# Patient Record
Sex: MALE | Race: BLACK OR AFRICAN AMERICAN | NOT HISPANIC OR LATINO | Employment: FULL TIME | URBAN - METROPOLITAN AREA
[De-identification: names, ages, dates, MRNs, and addresses within clinical notes are randomized per-mention and may not be internally consistent; named-entity substitution may affect disease eponyms.]

---

## 2018-01-22 ENCOUNTER — HOSPITAL ENCOUNTER (OUTPATIENT)
Dept: INTERVENTIONAL RADIOLOGY/VASCULAR | Facility: HOSPITAL | Age: 28
Discharge: HOME | End: 2018-01-22
Attending: FAMILY MEDICINE
Payer: OTHER GOVERNMENT

## 2018-01-22 ENCOUNTER — HOSPITAL ENCOUNTER (OUTPATIENT)
Dept: RADIOLOGY | Facility: HOSPITAL | Age: 28
Discharge: HOME | End: 2018-01-22
Attending: FAMILY MEDICINE
Payer: OTHER GOVERNMENT

## 2018-01-22 DIAGNOSIS — M25.512 PAIN IN LEFT SHOULDER: ICD-10-CM

## 2018-01-22 PROCEDURE — 77002 NEEDLE LOCALIZATION BY XRAY: CPT | Mod: TC

## 2018-01-22 PROCEDURE — 23350 INJECTION FOR SHOULDER X-RAY: CPT | Mod: LT,,, | Performed by: RADIOLOGY

## 2018-01-22 PROCEDURE — 73222 MRI JOINT UPR EXTREM W/DYE: CPT | Mod: 26,LT,, | Performed by: RADIOLOGY

## 2018-01-22 PROCEDURE — 73222 MRI JOINT UPR EXTREM W/DYE: CPT | Mod: TC,LT

## 2018-01-22 PROCEDURE — A9585 GADOBUTROL INJECTION: HCPCS

## 2018-01-22 PROCEDURE — 25500020 PHARM REV CODE 255

## 2018-01-22 PROCEDURE — 23350 INJECTION FOR SHOULDER X-RAY: CPT

## 2018-01-22 PROCEDURE — 77002 NEEDLE LOCALIZATION BY XRAY: CPT | Mod: 26,,, | Performed by: RADIOLOGY

## 2018-01-22 RX ORDER — GADOBUTROL 604.72 MG/ML
1 INJECTION INTRAVENOUS
Status: COMPLETED | OUTPATIENT
Start: 2018-01-22 | End: 2018-01-22

## 2018-01-22 RX ADMIN — GADOBUTROL 1 ML: 604.72 INJECTION INTRAVENOUS at 03:01

## 2018-01-22 RX ADMIN — IOHEXOL 8 ML: 350 INJECTION, SOLUTION INTRAVENOUS at 03:01

## 2018-01-29 ENCOUNTER — HOSPITAL ENCOUNTER (EMERGENCY)
Facility: HOSPITAL | Age: 28
Discharge: HOME | End: 2018-01-29
Attending: EMERGENCY MEDICINE
Payer: OTHER GOVERNMENT

## 2018-01-29 VITALS
HEART RATE: 58 BPM | DIASTOLIC BLOOD PRESSURE: 78 MMHG | OXYGEN SATURATION: 99 % | RESPIRATION RATE: 18 BRPM | HEIGHT: 69 IN | SYSTOLIC BLOOD PRESSURE: 133 MMHG | WEIGHT: 185 LBS | TEMPERATURE: 98 F | BODY MASS INDEX: 27.4 KG/M2

## 2018-01-29 DIAGNOSIS — A08.4 VIRAL GASTROENTERITIS: Primary | ICD-10-CM

## 2018-01-29 PROCEDURE — 99283 EMERGENCY DEPT VISIT LOW MDM: CPT

## 2018-01-29 PROCEDURE — 25000003 PHARM REV CODE 250: Performed by: NURSE PRACTITIONER

## 2018-01-29 RX ORDER — ONDANSETRON 8 MG/1
8 TABLET, ORALLY DISINTEGRATING ORAL
Status: COMPLETED | OUTPATIENT
Start: 2018-01-29 | End: 2018-01-29

## 2018-01-29 RX ORDER — ONDANSETRON 4 MG/1
4 TABLET, FILM COATED ORAL EVERY 6 HOURS PRN
Qty: 12 TABLET | Refills: 0 | Status: SHIPPED | OUTPATIENT
Start: 2018-01-29

## 2018-01-29 RX ORDER — DICYCLOMINE HYDROCHLORIDE 20 MG/1
20 TABLET ORAL 2 TIMES DAILY PRN
Qty: 20 TABLET | Refills: 0 | Status: SHIPPED | OUTPATIENT
Start: 2018-01-29 | End: 2018-02-28

## 2018-01-29 RX ORDER — DICYCLOMINE HYDROCHLORIDE 10 MG/1
20 CAPSULE ORAL
Status: COMPLETED | OUTPATIENT
Start: 2018-01-29 | End: 2018-01-29

## 2018-01-29 RX ADMIN — DICYCLOMINE HYDROCHLORIDE 20 MG: 10 CAPSULE ORAL at 04:01

## 2018-01-29 RX ADMIN — ONDANSETRON 8 MG: 8 TABLET, ORALLY DISINTEGRATING ORAL at 04:01

## 2018-01-29 NOTE — ED PROVIDER NOTES
"Encounter Date: 1/29/2018       History     Chief Complaint   Patient presents with    Vomiting     states " I think I have a stomach virus, I have been throwing up all day". Denies taking any medication     CC: Vomiting    HPI: Patient is a 27-year-old male with no known medical history who presents today with chief complaint of vomiting.  He reports last night he began to feel nauseated and today he has been unable to keep anything down.  He reports 3 episodes of emesis today.  He also reports 10 episodes of diarrhea today.  No blood in his vomit or stool.  He thinks he may have gotten food poisoning.  He denies fever, chills, shortness of breath, chest pain, abdominal pain, dysuria, increased urinary urgency or frequency.      The history is provided by the patient. No  was used.     Review of patient's allergies indicates:   Allergen Reactions    Sulfa (sulfonamide antibiotics) Hives     History reviewed. No pertinent past medical history.  Past Surgical History:   Procedure Laterality Date    ADENOIDECTOMY      TONSILLECTOMY       History reviewed. No pertinent family history.  Social History   Substance Use Topics    Smoking status: Former Smoker    Smokeless tobacco: Never Used    Alcohol use Yes      Comment: occ     Review of Systems   Constitutional: Negative for chills and fever.   HENT: Negative for sore throat.    Respiratory: Negative for shortness of breath.    Cardiovascular: Negative for chest pain.   Gastrointestinal: Positive for diarrhea, nausea and vomiting. Negative for abdominal distention, abdominal pain, anal bleeding, blood in stool, constipation and rectal pain.   Genitourinary: Negative for decreased urine volume, difficulty urinating, discharge, dysuria, frequency, hematuria, penile pain, penile swelling, scrotal swelling, testicular pain and urgency.   Musculoskeletal: Negative for back pain, neck pain and neck stiffness.   Skin: Negative for rash. "   Neurological: Negative for weakness, light-headedness and headaches.   Hematological: Does not bruise/bleed easily.       Physical Exam     Initial Vitals [01/29/18 1600]   BP Pulse Resp Temp SpO2   (!) 144/66 62 18 98.2 °F (36.8 °C) 100 %      MAP       92         Physical Exam    Vitals reviewed.  Constitutional: He appears well-developed and well-nourished. He is not diaphoretic.  Non-toxic appearance. He does not have a sickly appearance. He does not appear ill. No distress.   HENT:   Head: Normocephalic and atraumatic.   Right Ear: External ear normal.   Left Ear: External ear normal.   Neck: Normal range of motion.   Cardiovascular: Normal rate, regular rhythm and normal heart sounds. Exam reveals no gallop and no friction rub.    No murmur heard.  Pulmonary/Chest: Breath sounds normal. No respiratory distress. He has no wheezes. He has no rhonchi. He has no rales. He exhibits no tenderness.   Abdominal: Soft. Bowel sounds are normal. He exhibits no distension and no mass. There is no hepatosplenomegaly. There is no tenderness. There is no rigidity, no rebound, no guarding, no CVA tenderness, no tenderness at McBurney's point and negative Silveira's sign.   Musculoskeletal: Normal range of motion.   Neurological: He is alert and oriented to person, place, and time. GCS eye subscore is 4. GCS verbal subscore is 5. GCS motor subscore is 6.   Skin: Skin is warm, dry and intact. No rash noted. No erythema.   Psychiatric: He has a normal mood and affect. Thought content normal.         ED Course   Procedures  Labs Reviewed - No data to display          Medical Decision Making:   ED Management:  Physical Exam shows a non-toxic, afebrile, and well appearing male. On examination the abdomen is flat without distention.  There is no surface trauma, scars, incisions.  Bowel sounds are present in all 4 quadrants.  No tenderness, guarding, rigidity to palpation.  No tenderness, masses, pulsation in epigastric area.  No  organomegaly.  Negative Silveira sign.  No periumbilical tenderness.  No rebound in the lower quadrants.  Nontender over McBurney's point.  No suprapubic tenderness or distention.  Good femoral pulses bilaterally.  No CVA tenderness.    Vital Signs Are Reassuring. If available, previous records reviewed.     My overall impression is viral gastroenteritis. I considered, but at this time, do not suspect C. difficile, dysentery, inflammatory bowel disease, diverticulitis, ulcerative colitis.    Patient given Zofran and Bentyl with good relief in symptoms.  No episodes of vomiting or diarrhea in the ED.  Abdomen is soft and nontender.  I believe that this is a viral gastroenteritis.  His vital signs are stable.  He is not hypotensive, tachycardic, febrile.  I feel is appropriate discharge him home with Bentyl and Zofran for symptomatic relief.  He is encouraged to increase oral fluid intake and follow-up with his PCP.    ED Course: bentyl, zofran. D/C Meds: bentyl, zofran. Additional D/C Information: Proventil Tylenol for fever and pain.  Increase oral fluid intake. The diagnosis, treatment plan, instructions for follow-up and reevaluation with PCP as well as ED return precautions were discussed and understanding was verbalized. All questions or concerns have been addressed.     This case was discussed with Dr. Nicolas who is in agreement with my assessment and plan.                    ED Course      Clinical Impression:   The encounter diagnosis was Viral gastroenteritis.    Disposition:   Disposition: Discharged  Condition: Stable                        Shirley Valdez NP  01/29/18 7522

## 2018-01-29 NOTE — DISCHARGE INSTRUCTIONS
Please return to the Emergency Department for any new or worsening symptoms including: abdominal pain, fever, chest pain, shortness of breath, loss of consciousness, dizziness, weakness, or any other concerns.     Please follow up with your Primary Care Provider within the week. If you do not have one, you may contact the one listed on your discharge paperwork or you may also call the [facility] Appointment Desk at [phone] to schedule an appointment with one.     Please take all medication as prescribed.

## 2018-11-06 ENCOUNTER — HOSPITAL ENCOUNTER (EMERGENCY)
Facility: HOSPITAL | Age: 28
Discharge: HOME | End: 2018-11-06
Attending: EMERGENCY MEDICINE
Payer: OTHER GOVERNMENT

## 2018-11-06 VITALS
DIASTOLIC BLOOD PRESSURE: 90 MMHG | TEMPERATURE: 98 F | SYSTOLIC BLOOD PRESSURE: 138 MMHG | HEART RATE: 53 BPM | BODY MASS INDEX: 27.99 KG/M2 | WEIGHT: 189 LBS | HEIGHT: 69 IN | RESPIRATION RATE: 16 BRPM | OXYGEN SATURATION: 97 %

## 2018-11-06 DIAGNOSIS — S76.302A HAMSTRING INJURY, LEFT, INITIAL ENCOUNTER: Primary | ICD-10-CM

## 2018-11-06 PROCEDURE — 99284 EMERGENCY DEPT VISIT MOD MDM: CPT | Mod: 25

## 2018-11-06 PROCEDURE — 96372 THER/PROPH/DIAG INJ SC/IM: CPT

## 2018-11-06 PROCEDURE — 63600175 PHARM REV CODE 636 W HCPCS: Performed by: EMERGENCY MEDICINE

## 2018-11-06 RX ORDER — IBUPROFEN 800 MG/1
800 TABLET ORAL EVERY 8 HOURS
Qty: 30 TABLET | Refills: 0 | Status: SHIPPED | OUTPATIENT
Start: 2018-11-06 | End: 2018-11-13

## 2018-11-06 RX ORDER — KETOROLAC TROMETHAMINE 30 MG/ML
60 INJECTION, SOLUTION INTRAMUSCULAR; INTRAVENOUS
Status: COMPLETED | OUTPATIENT
Start: 2018-11-06 | End: 2018-11-06

## 2018-11-06 RX ADMIN — KETOROLAC TROMETHAMINE 60 MG: 30 INJECTION, SOLUTION INTRAMUSCULAR at 09:11

## 2018-11-06 NOTE — ED NOTES
No past medical history on file.  Pt presented to ed with co left upper extremity ( mid muscle) pain after running sprints this am.  Pt denies taking medication.  He states this happened once before 2 years ago.  Denies knee discomfort , he is able to flex and bear wgt.

## 2018-11-06 NOTE — ED PROVIDER NOTES
"Encounter Date: 11/6/2018    SCRIBE #1 NOTE: I, Ninolois Pandey, am scribing for, and in the presence of,  Han Nicolas MD. I have scribed the following portions of the note - Other sections scribed: HPI and ROS.       History     Chief Complaint   Patient presents with    Leg Pain     left hamstring pains after running sprints this am.  denies other trauma.       CC: Leg Pain    HPI: This is a 27 y.o. male no PMHx who presents with left leg pain after running sprints this morning and hearing a "pop" coming from his hamstring. He is ambulatory. No knee pain, hip pain. Patient had a similar episode happen to him some years ago affecting the same leg. He did not seek medical care at the time. No treatment attempted. He has full range of motion. Can bear weight as well. No further symptoms. Allergic to sulfa drugs.      The history is provided by the patient. No  was used.     Review of patient's allergies indicates:   Allergen Reactions    Sulfa (sulfonamide antibiotics) Hives     No past medical history on file.  Past Surgical History:   Procedure Laterality Date    ADENOIDECTOMY      TONSILLECTOMY       No family history on file.  Social History     Tobacco Use    Smoking status: Former Smoker    Smokeless tobacco: Never Used   Substance Use Topics    Alcohol use: Yes     Comment: occ    Drug use: Not on file     Review of Systems   Constitutional: Negative for chills, diaphoresis and fever.   HENT: Negative for rhinorrhea and sore throat.    Eyes: Negative for visual disturbance.   Respiratory: Negative for cough and shortness of breath.    Cardiovascular: Negative for chest pain.   Gastrointestinal: Negative for abdominal pain, constipation, diarrhea, nausea and vomiting.   Genitourinary: Negative for dysuria.   Musculoskeletal: Positive for myalgias (L leg). Negative for arthralgias.   Neurological: Negative for headaches.   All other systems reviewed and are " negative.      Physical Exam     Initial Vitals [11/06/18 0838]   BP Pulse Resp Temp SpO2   132/79 71 16 98.1 °F (36.7 °C) 97 %      MAP       --         Physical Exam    Nursing note and vitals reviewed.  Constitutional: He appears well-developed and well-nourished.   HENT:   Head: Atraumatic.   Eyes: EOM are normal. Pupils are equal, round, and reactive to light.   Neck: Normal range of motion. Neck supple. No JVD present.   Cardiovascular: Normal rate, regular rhythm, normal heart sounds and intact distal pulses. Exam reveals no gallop and no friction rub.    No murmur heard.  Pulmonary/Chest: Breath sounds normal. No respiratory distress. He has no wheezes. He has no rhonchi. He has no rales.   Abdominal: Soft. Bowel sounds are normal. He exhibits no distension. There is no tenderness. There is no rebound and no guarding.   Musculoskeletal: Normal range of motion. He exhibits tenderness.   Left hamstring muscle ttp. Consistent with partial muscle tear vs strain. Able to flex knee.    Lymphadenopathy:     He has no cervical adenopathy.   Neurological: He is alert and oriented to person, place, and time. He has normal strength and normal reflexes. He displays normal reflexes. No cranial nerve deficit or sensory deficit. GCS score is 15. GCS eye subscore is 4. GCS verbal subscore is 5. GCS motor subscore is 6.   Skin: Skin is warm and dry. Capillary refill takes less than 2 seconds. No rash noted.   Psychiatric: He has a normal mood and affect. Thought content normal.         ED Course   Procedures  Labs Reviewed - No data to display       Imaging Results    None                     Scribe Attestation:   Scribe #1: I performed the above scribed service and the documentation accurately describes the services I performed. I attest to the accuracy of the note.    Attending Attestation:           Physician Attestation for Scribe:  Physician Attestation Statement for Scribe #1: I, Han Nicolas MD, reviewed  documentation, as scribed by Nino Pandey in my presence, and it is both accurate and complete.                    Clinical Impression:   The encounter diagnosis was Hamstring injury, left, initial encounter.                             Han Nicolas MD  11/28/18 3691

## 2019-04-05 ENCOUNTER — HOSPITAL ENCOUNTER (OUTPATIENT)
Dept: RADIOLOGY | Facility: HOSPITAL | Age: 29
Discharge: HOME | End: 2019-04-05
Payer: OTHER GOVERNMENT

## 2019-04-05 ENCOUNTER — HOSPITAL ENCOUNTER (OUTPATIENT)
Dept: RADIOLOGY | Facility: HOSPITAL | Age: 29
Discharge: HOME | End: 2019-04-05
Attending: ORTHOPAEDIC SURGERY
Payer: OTHER GOVERNMENT

## 2019-04-05 DIAGNOSIS — M25.512 LEFT SHOULDER PAIN: ICD-10-CM

## 2019-04-05 PROCEDURE — 23350 XR ARTHROGRAM SHOULDER LEFT WITH MRI TO FOLLOW: ICD-10-PCS | Mod: LT,,, | Performed by: RADIOLOGY

## 2019-04-05 PROCEDURE — 73040 CONTRAST X-RAY OF SHOULDER: CPT | Mod: TC,LT

## 2019-04-05 PROCEDURE — 73222 MRI JOINT UPR EXTREM W/DYE: CPT | Mod: 26,LT,, | Performed by: RADIOLOGY

## 2019-04-05 PROCEDURE — 73040 CONTRAST X-RAY OF SHOULDER: CPT | Mod: 26,LT,, | Performed by: RADIOLOGY

## 2019-04-05 PROCEDURE — 73222 MRI JOINT UPR EXTREM W/DYE: CPT | Mod: TC,LT

## 2019-04-05 PROCEDURE — 73222 MRI ARTHROGRAM SHOULDER WITH CONTRAST LEFT: ICD-10-PCS | Mod: 26,LT,, | Performed by: RADIOLOGY

## 2019-04-05 PROCEDURE — 23350 INJECTION FOR SHOULDER X-RAY: CPT | Mod: LT,,, | Performed by: RADIOLOGY

## 2019-04-05 PROCEDURE — 73040 XR ARTHROGRAM SHOULDER LEFT WITH MRI TO FOLLOW: ICD-10-PCS | Mod: 26,LT,, | Performed by: RADIOLOGY

## 2019-04-05 PROCEDURE — 25500020 PHARM REV CODE 255: Performed by: RADIOLOGY

## 2019-04-05 RX ORDER — GADOBUTROL 604.72 MG/ML
1 INJECTION INTRAVENOUS
Status: COMPLETED | OUTPATIENT
Start: 2019-04-05 | End: 2019-04-05

## 2019-04-05 RX ADMIN — GADOBUTROL 1 ML: 604.72 INJECTION INTRAVENOUS at 01:04

## 2019-04-05 RX ADMIN — IOHEXOL 4 ML: 350 INJECTION, SOLUTION INTRAVENOUS at 01:04

## 2019-04-05 NOTE — PROCEDURES
Radiology Post-Procedure Note    Pre Op Diagnosis: Acute left shoulder pain with concerns for labral tear  Post Op Diagnosis: Same    Procedure: Fluoroscopic-guided left shoulder arthrogram    Procedure performed by: Wali Gomez MD    Written Informed Consent Obtained: Yes  Specimen Removed: No   Estimated Blood Loss: none    Findings:   Successful fluoroscopic-guided left shoulder arthrogram with 1-cc gadavist, 10-cc of sterile NS, 3-cc of Ominpaque 350 and 2-cc of 1% lidocaine. Patient tolerated the procedure well. No immediate post-procedural complications noted.       Wali Gomez MD  Interventional Radiology

## 2019-04-05 NOTE — H&P
Radiology History & Physical      SUBJECTIVE:     Chief Complaint: Left shoulder pain. H/o SLAP labral tear    History of Present Illness:  Satya Garcia is a 28 y.o. male with h/o chronic left shoulder pain and reported SLAP labral tear who presents for left shoulder injection/arthrogram for subsequent Lt should MRI arthrogram.    History reviewed. No pertinent past medical history.  Past Surgical History:   Procedure Laterality Date    ADENOIDECTOMY      TONSILLECTOMY         Home Meds:   Prior to Admission medications    Medication Sig Start Date End Date Taking? Authorizing Provider   ondansetron (ZOFRAN) 4 MG tablet Take 1 tablet (4 mg total) by mouth every 6 (six) hours as needed for Nausea. 1/29/18   Shirley Valdez NP     Anticoagulants/Antiplatelets: no anticoagulation    Allergies:   Review of patient's allergies indicates:   Allergen Reactions    Sulfa (sulfonamide antibiotics) Hives     Sedation History:  no adverse reactions    Review of Systems:   Hematological: no known coagulopathies  Respiratory: no cough, shortness of breath, or wheezing  Cardiovascular: no chest pain or dyspnea on exertion  Gastrointestinal: no abdominal pain, change in bowel habits, or black or bloody stools  Genito-Urinary: no dysuria, trouble voiding, or hematuria  Musculoskeletal: negative  positive for - left shoulder joint pain  Neurological: no TIA or stroke symptoms         OBJECTIVE:     Vital Signs (Most Recent)       Physical Exam:  ASA: I  Mallampati: I    General: no acute distress  Mental Status: alert and oriented to person, place and time  HEENT: normocephalic, atraumatic  Chest: unlabored breathing  Heart: regular heart rate  Abdomen: nondistended  Extremity: moves all extremities    Laboratory  No results found for: INR  No results found for: WBC, HGB, HCT, MCV, PLT No results found for: GLU, NA, K, CL, CO2, BUN, CREATININE, CALCIUM, MG, ALT, AST, ALBUMIN, BILITOT, BILIDIR    ASSESSMENT/PLAN:     29 y/o M with  chronic left shoulder pain and reported left shoulder SLAP labral tear.     1. Will attempt fluoroscopic-guided injection of iodinated and gadolinium contrast solution into the left glenohumeral joint for subsequent MRI arthrogram.     Risks (including, but not limited to, pain, bleeding, infection, damage to nearby structures, failure to obtain sufficient material for a diagnosis, the need for additional procedures, and death), benefits, and alternatives were discussed with the patient. All questions were answered to the best of my abilities. The patient wishes to proceed with the procedure. Written informed consent was obtained.    Thank you for considering IR for the care of your patient.     Wali Gomez MD  Interventional Radiology

## 2019-04-05 NOTE — DISCHARGE SUMMARY
Radiology Discharge Summary      Hospital Course: No complications    Admit Date: 4/5/2019  Discharge Date: 04/05/2019     Instructions Given to Patient: Yes  Diet: Resume prior diet  Activity: activity as tolerated    Description of Condition on Discharge: Stable  Vital Signs (Most Recent):      Discharge Disposition: Home    Discharge Diagnosis:   29 y/o M with acute onset of left shoulder pain with concerns for labral tear s/p left shoulder contrast injection for MRI arthrogram. Patient tolerated the procedure well. No immediate post-procedural complications noted.       Wali Gomez MD  Interventional Radiology

## 2020-04-26 ENCOUNTER — HOSPITAL ENCOUNTER (EMERGENCY)
Facility: HOSPITAL | Age: 30
Discharge: HOME | End: 2020-04-26
Attending: EMERGENCY MEDICINE
Payer: OTHER GOVERNMENT

## 2020-04-26 VITALS
WEIGHT: 200 LBS | OXYGEN SATURATION: 97 % | SYSTOLIC BLOOD PRESSURE: 143 MMHG | TEMPERATURE: 98 F | HEART RATE: 67 BPM | RESPIRATION RATE: 18 BRPM | DIASTOLIC BLOOD PRESSURE: 89 MMHG | BODY MASS INDEX: 29.62 KG/M2 | HEIGHT: 69 IN

## 2020-04-26 DIAGNOSIS — M54.32 LEFT SIDED SCIATICA: Primary | ICD-10-CM

## 2020-04-26 PROCEDURE — 96372 THER/PROPH/DIAG INJ SC/IM: CPT

## 2020-04-26 PROCEDURE — 99284 EMERGENCY DEPT VISIT MOD MDM: CPT | Mod: 25

## 2020-04-26 PROCEDURE — 63600175 PHARM REV CODE 636 W HCPCS: Performed by: NURSE PRACTITIONER

## 2020-04-26 RX ORDER — KETOROLAC TROMETHAMINE 30 MG/ML
15 INJECTION, SOLUTION INTRAMUSCULAR; INTRAVENOUS
Status: COMPLETED | OUTPATIENT
Start: 2020-04-26 | End: 2020-04-26

## 2020-04-26 RX ORDER — CYCLOBENZAPRINE HCL 10 MG
10 TABLET ORAL 3 TIMES DAILY PRN
Qty: 15 TABLET | Refills: 0 | Status: SHIPPED | OUTPATIENT
Start: 2020-04-26 | End: 2020-05-01

## 2020-04-26 RX ORDER — SULINDAC 150 MG/1
150 TABLET ORAL 2 TIMES DAILY
Qty: 10 TABLET | Refills: 0 | Status: SHIPPED | OUTPATIENT
Start: 2020-04-26 | End: 2020-05-01

## 2020-04-26 RX ORDER — ORPHENADRINE CITRATE 30 MG/ML
30 INJECTION INTRAMUSCULAR; INTRAVENOUS
Status: COMPLETED | OUTPATIENT
Start: 2020-04-26 | End: 2020-04-26

## 2020-04-26 RX ADMIN — ORPHENADRINE CITRATE 30 MG: 30 INJECTION INTRAMUSCULAR; INTRAVENOUS at 11:04

## 2020-04-26 RX ADMIN — KETOROLAC TROMETHAMINE 15 MG: 30 INJECTION, SOLUTION INTRAMUSCULAR at 11:04

## 2020-04-26 NOTE — DISCHARGE INSTRUCTIONS
You have been given an anti-inflammatory (clinoril (sulindac)) prescription.  While taking this medication, do not use ibuprofen, motrin, advil, aleve, or any other anti-inflammatory. You have been given a muscle relaxer (flexeril (cyclobenzapril)) prescription. While taking this medication, do not drive, operate heavy machinery or  drink alcohol.Return to the Emergency department for any worsening or failure to improve, otherwise follow up with your primary care provider.  Do not take medications prescribed for home use until at least 8h after injections given in ED.

## 2020-04-26 NOTE — ED PROVIDER NOTES
Encounter Date: 4/26/2020    SCRIBE #1 NOTE: I, Christina Rae, am scribing for, and in the presence of,  Mirza Ozuna DNP. I have scribed the entire note.       History     Chief Complaint   Patient presents with    Back Pain     Pt c/o lower back pain that started yesterday while lifting weights. Denies dysuria. Pain is 8/10     CC: Back pain    HPI: This is a 29 y.o.male patient, with no pertinent PMHx, presenting to the ED with a complaint of 8/10 lower back pain, that began yesterday. Patient states he was working out yesterday and his pain began shortly after. He describes the pain as a sharp pain that shoots down his right leg to his hamstring. He reports his pain is aggravated with bending and sitting and alleviated when standing. Patient reports attempting prior Tx with Ibuprofen, ice packs, foam rollers, and stretching. Patient denies any groin/joint numbness, weakness, tingling, bowel/bladder incontinence, fever, chills, shortness of breath, chest pain, neck pain, abdominal pain, rash, headaches, congestion, rhinorrhea, cough, sore throat, ear pain, eye pain, blurred vision, nausea, vomiting, diarrhea, dysuria, or any other associated symptoms. Allergic to Sulfa. No daily medication use.    The history is provided by the patient.     Review of patient's allergies indicates:   Allergen Reactions    Sulfa (sulfonamide antibiotics) Hives     No past medical history on file.  Past Surgical History:   Procedure Laterality Date    ADENOIDECTOMY      TONSILLECTOMY       No family history on file.  Social History     Tobacco Use    Smoking status: Former Smoker    Smokeless tobacco: Never Used   Substance Use Topics    Alcohol use: Yes     Comment: occ    Drug use: Not on file     Review of Systems   Constitutional: Negative for chills and fever.   HENT: Negative for congestion, ear pain, rhinorrhea and sore throat.    Eyes: Negative for pain and visual disturbance.   Respiratory: Negative for cough  and shortness of breath.    Cardiovascular: Negative for chest pain.   Gastrointestinal: Negative for abdominal pain, diarrhea, nausea and vomiting.        -bladder/bowel incontinence   Genitourinary: Negative for dysuria.   Musculoskeletal: Positive for arthralgias (R leg) and back pain. Negative for neck pain.   Skin: Negative for rash.   Neurological: Negative for weakness, numbness and headaches.        -tingling       Physical Exam     Initial Vitals [04/26/20 1120]   BP Pulse Resp Temp SpO2   (!) 143/89 67 18 97.7 °F (36.5 °C) 97 %      MAP       --         Physical Exam    Nursing note and vitals reviewed.  Constitutional: He appears well-developed and well-nourished. No distress.   HENT:   Head: Normocephalic and atraumatic.   Right Ear: Tympanic membrane normal.   Left Ear: Tympanic membrane normal.   Nose: Nose normal.   Mouth/Throat: Uvula is midline, oropharynx is clear and moist and mucous membranes are normal.   Eyes: EOM are normal. Pupils are equal, round, and reactive to light.   Neck: Trachea normal, normal range of motion, full passive range of motion without pain and phonation normal. Neck supple. No stridor present. No spinous process tenderness and no muscular tenderness present. Normal range of motion present. No neck rigidity.   Cardiovascular: Normal rate, regular rhythm and normal heart sounds. Exam reveals no gallop and no friction rub.    No murmur heard.  Pulmonary/Chest: Effort normal and breath sounds normal. No respiratory distress. He has no wheezes. He has no rhonchi. He has no rales.   Abdominal: Soft. Bowel sounds are normal. He exhibits no mass. There is no tenderness. There is no rebound and no guarding.   Musculoskeletal: Normal range of motion.   Exam revealed palpable spasm to paraspinal muscles on right side. Positive straight leg raise on right.   Neurological: He is alert and oriented to person, place, and time. He has normal strength. No cranial nerve deficit or sensory  deficit.   Skin: Skin is warm and dry. Capillary refill takes less than 2 seconds.   Psychiatric: He has a normal mood and affect.         ED Course   Procedures  Labs Reviewed - No data to display       Imaging Results    None          Medical Decision Making:   Initial Assessment:   Satya Garcia is a 29 y.o. male presenting for an emergent evaluation of back pain. I will treat the pt's symptoms appropriately and reassess.         APC / Resident Notes:   29-year-old male with lower back pain radiating down the right leg following a workout.  On physical exam there was no spinal tenderness or step-offs, a palpable spasm was located.  There was a positive straight leg raise on the right.  He denies distal numbness or tingling, no saddle anesthesia or paresthesias, no bowel or bladder incontinence.  The patient is discharged home in good condition to follow-up with his primary care provider.  We gave a injection of Toradol and Norflex in the emergency department he was discharged on Clinoril and Flexeril.See above for analyses of radiology, labs, and events during pt's visit and direct actions taken. Symptomatic therapies and return precautions on AVS.   Medication choices were made after reviewing allergies, medications, history, available laboratories. See below for discharge prescriptions if any and disposition.         Scribe Attestation:   Scribe #1: I performed the above scribed service and the documentation accurately describes the services I performed. I attest to the accuracy of the note.                          Clinical Impression:       ICD-10-CM ICD-9-CM   1. Left sided sciatica M54.32 724.3         Disposition:   Disposition: Discharged  Condition: Stable     ED Disposition Condition    Discharge Stable        ED Prescriptions     Medication Sig Dispense Start Date End Date Auth. Provider    sulindac (CLINORIL) 150 MG tablet Take 1 tablet (150 mg total) by mouth 2 (two) times daily. for 5 days 10 tablet  4/26/2020 5/1/2020 Mirza Ozuna DNP    cyclobenzaprine (FLEXERIL) 10 MG tablet Take 1 tablet (10 mg total) by mouth 3 (three) times daily as needed for Muscle spasms. 15 tablet 4/26/2020 5/1/2020 Mirza Ozuna DNP        Follow-up Information    None                     Diptiibyomi attestation: INEGIN DNP ACNP-BC FNP-C , personally performed the services described in this documentation. All medical record entries made by the scribe were at my direction and in my presence.  I have reviewed the chart and agree that the record reflects my personal performance and is accurate and complete.               Mirza Ozuna DNP  04/26/20 1619

## 2020-04-26 NOTE — ED TRIAGE NOTES
Patient verbalized working out on yesterday after working out experience back pain, patient rate pain 8/10, patient verbalized taking ibuprofen 400 mg yesterday and applied ice pack to back